# Patient Record
Sex: FEMALE | Race: OTHER
[De-identification: names, ages, dates, MRNs, and addresses within clinical notes are randomized per-mention and may not be internally consistent; named-entity substitution may affect disease eponyms.]

---

## 2020-08-16 ENCOUNTER — HOSPITAL ENCOUNTER (EMERGENCY)
Dept: HOSPITAL 41 - JD.ED | Age: 45
LOS: 1 days | Discharge: HOME | End: 2020-08-17
Payer: SELF-PAY

## 2020-08-16 DIAGNOSIS — E66.9: ICD-10-CM

## 2020-08-16 DIAGNOSIS — F41.9: Primary | ICD-10-CM

## 2020-08-16 PROCEDURE — 80053 COMPREHEN METABOLIC PANEL: CPT

## 2020-08-16 PROCEDURE — 93005 ELECTROCARDIOGRAM TRACING: CPT

## 2020-08-16 PROCEDURE — 99285 EMERGENCY DEPT VISIT HI MDM: CPT

## 2020-08-16 PROCEDURE — 84484 ASSAY OF TROPONIN QUANT: CPT

## 2020-08-16 PROCEDURE — 85025 COMPLETE CBC W/AUTO DIFF WBC: CPT

## 2020-08-16 PROCEDURE — 36415 COLL VENOUS BLD VENIPUNCTURE: CPT

## 2020-08-16 PROCEDURE — 85730 THROMBOPLASTIN TIME PARTIAL: CPT

## 2020-08-16 PROCEDURE — 81025 URINE PREGNANCY TEST: CPT

## 2020-08-16 PROCEDURE — 85610 PROTHROMBIN TIME: CPT

## 2020-08-16 PROCEDURE — 83735 ASSAY OF MAGNESIUM: CPT

## 2020-08-16 PROCEDURE — 71045 X-RAY EXAM CHEST 1 VIEW: CPT

## 2020-08-16 PROCEDURE — 93225 XTRNL ECG REC<48 HRS REC: CPT

## 2020-08-16 NOTE — EDM.PDOC
ED HPI GENERAL MEDICAL PROBLEM





- General


Chief Complaint: Cardiovascular Problem


Stated Complaint: IRREGUALR HEART BEAT SOB


Time Seen by Provider: 08/16/20 22:00





- History of Present Illness


INITIAL COMMENTS - FREE TEXT/NARRATIVE: 





45-year-old female presents the emergency room with palpitations and what she 

thinks is an irregular heartbeat.





This started a couple hours prior to arrival the patient feels a little bit 

anxious.  Patient denies any breathing difficulties chest pain or shortness of 

breath.  Patient has a history of prediabetes but denies any other medical 

problems.  The patient is visiting family from [place].  And is scheduled to go 

home this next week.


  ** Mid-Sternal Chest


Pain Score (Numeric/FACES): 4





- Related Data


                                    Allergies











Allergy/AdvReac Type Severity Reaction Status Date / Time


 


No Known Allergies Allergy   Verified 08/16/20 21:40











Home Meds: 


                                    Home Meds





Amoxicillin [Amoxil 250 MG/5 ML Susp] 500 mg PO TID 08/16/20 [History]


LORazepam [Ativan] 1 mg PO ASDIRECTED PRN #5 tablet 08/17/20 [Rx]











Past Medical History





- Past Surgical History


HEENT Surgical History: Reports: Oral Surgery





Social & Family History





- Tobacco Use


Smoking Status *Q: Never Smoker





- Caffeine Use


Caffeine Use: Reports: None





- Recreational Drug Use


Recreational Drug Use: No





ED ROS GENERAL





- Review of Systems


Review Of Systems: See Below


Constitutional: Reports: No Symptoms


HEENT: Reports: No Symptoms


Respiratory: Reports: No Symptoms


Cardiovascular: Reports: Palpitations.  Denies: Chest Pain


Endocrine: Reports: No Symptoms


GI/Abdominal: Reports: No Symptoms


: Reports: No Symptoms


Neurological: Reports: No Symptoms


Psychiatric: Reports: No Symptoms





ED EXAM, GENERAL





- Physical Exam


Exam: See Below


Exam Limited By: Language Barrier (She speaks pretty good English her daughter 

is able to translate if any clarification needs to be done)


General Appearance: Alert, No Apparent Distress, Obese


Head: Atraumatic, Normocephalic


Respiratory/Chest: No Respiratory Distress, Lungs Clear, Normal Breath Sounds


Cardiovascular: No Edema, No Murmur, Tachycardia, Other (Mild tachycardia rate 

about 110 during my exam)


GI/Abdominal: Normal Bowel Sounds, Soft, Non-Tender, Other (Significant 

abdominal obesity)


Back Exam: Normal Inspection.  No: CVA Tenderness (L), CVA Tenderness (R)


Extremities: Normal Inspection, Non-Tender, No Pedal Edema


Neurological: Alert, Oriented, Normal Cognition


Psychiatric: Anxious





Course





- Vital Signs


Last Recorded V/S: 


                                Last Vital Signs











Temp  36.2 C   08/16/20 23:49


 


Pulse  96   08/16/20 23:49


 


Resp  18   08/16/20 23:49


 


BP  130/64   08/16/20 23:49


 


Pulse Ox  96   08/16/20 23:49














- Orders/Labs/Meds


Orders: 


                               Active Orders 24 hr











 Category Date Time Status


 


 EKG Documentation Completion [RC] ASDIRECTED Care  08/16/20 22:14 Active


 


 Holter Monitor 24 Hours [RC] .PRN Care  08/16/20 23:57 Active


 


 Chest 1V Frontal [CR] Stat Exams  08/16/20 22:10 Taken


 


 EKG 12 Lead [EK] Stat Ther  08/16/20 22:14 Ordered











Labs: 


                                Laboratory Tests











  08/16/20 08/16/20 08/16/20 Range/Units





  22:22 22:22 22:22 


 


WBC    7.55  (3.98-10.04)  K/mm3


 


RBC    4.69  (3.98-5.22)  M/mm3


 


Hgb    12.3  (11.2-15.7)  gm/dl


 


Hct    38.6  (34.1-44.9)  %


 


MCV    82.3  (79.4-94.8)  fl


 


MCH    26.2  (25.6-32.2)  pg


 


MCHC    31.9 L  (32.2-35.5)  g/dl


 


RDW Std Deviation    44.1  (36.4-46.3)  fL


 


Plt Count    261  (182-369)  K/mm3


 


MPV    9.6  (9.4-12.3)  fl


 


Neut % (Auto)    55.4  (34.0-71.1)  %


 


Lymph % (Auto)    32.1  (19.3-51.7)  %


 


Mono % (Auto)    10.5  (4.7-12.5)  %


 


Eos % (Auto)    1.3  (0.7-5.8)  


 


Baso % (Auto)    0.4  (0.1-1.2)  %


 


Neut # (Auto)    4.19  (1.56-6.13)  K/mm3


 


Lymph # (Auto)    2.42  (1.18-3.74)  K/mm3


 


Mono # (Auto)    0.79 H  (0.24-0.36)  K/mm3


 


Eos # (Auto)    0.10  (0.04-0.36)  K/mm3


 


Baso # (Auto)    0.03  (0.01-0.08)  K/mm3


 


PT  10.7    (9.7-12.0)  SECONDS


 


INR  1.00    


 


APTT  24    (22-31)  SECONDS


 


Sodium   139   (136-145)  mEq/L


 


Potassium   3.9   (3.5-5.1)  mEq/L


 


Chloride   104   ()  mEq/L


 


Carbon Dioxide   27   (21-32)  mEq/L


 


Anion Gap   11.9   (5-15)  


 


BUN   8   (7-18)  mg/dL


 


Creatinine   0.7   (0.55-1.02)  mg/dL


 


Est Cr Clr Drug Dosing   72.13   mL/min


 


Estimated GFR (MDRD)   > 60   (>60)  mL/min


 


BUN/Creatinine Ratio   11.4 L   (14-18)  


 


Glucose   105   ()  mg/dL


 


Calcium   9.4   (8.5-10.1)  mg/dL


 


Magnesium   1.8   (1.8-2.4)  mg/dl


 


Total Bilirubin   0.3   (0.2-1.0)  mg/dL


 


AST   22   (15-37)  U/L


 


ALT   38   (14-59)  U/L


 


Alkaline Phosphatase   93   ()  U/L


 


Troponin I   < 0.017   (0.00-0.056)  ng/mL


 


Total Protein   7.6   (6.4-8.2)  g/dl


 


Albumin   3.7   (3.4-5.0)  g/dl


 


Globulin   3.9   gm/dL


 


Albumin/Globulin Ratio   1.0   (1-2)  


 


Urine HCG, Qual     (NEGATIVE)  














  08/16/20 Range/Units





  23:13 


 


WBC   (3.98-10.04)  K/mm3


 


RBC   (3.98-5.22)  M/mm3


 


Hgb   (11.2-15.7)  gm/dl


 


Hct   (34.1-44.9)  %


 


MCV   (79.4-94.8)  fl


 


MCH   (25.6-32.2)  pg


 


MCHC   (32.2-35.5)  g/dl


 


RDW Std Deviation   (36.4-46.3)  fL


 


Plt Count   (182-369)  K/mm3


 


MPV   (9.4-12.3)  fl


 


Neut % (Auto)   (34.0-71.1)  %


 


Lymph % (Auto)   (19.3-51.7)  %


 


Mono % (Auto)   (4.7-12.5)  %


 


Eos % (Auto)   (0.7-5.8)  


 


Baso % (Auto)   (0.1-1.2)  %


 


Neut # (Auto)   (1.56-6.13)  K/mm3


 


Lymph # (Auto)   (1.18-3.74)  K/mm3


 


Mono # (Auto)   (0.24-0.36)  K/mm3


 


Eos # (Auto)   (0.04-0.36)  K/mm3


 


Baso # (Auto)   (0.01-0.08)  K/mm3


 


PT   (9.7-12.0)  SECONDS


 


INR   


 


APTT   (22-31)  SECONDS


 


Sodium   (136-145)  mEq/L


 


Potassium   (3.5-5.1)  mEq/L


 


Chloride   ()  mEq/L


 


Carbon Dioxide   (21-32)  mEq/L


 


Anion Gap   (5-15)  


 


BUN   (7-18)  mg/dL


 


Creatinine   (0.55-1.02)  mg/dL


 


Est Cr Clr Drug Dosing   mL/min


 


Estimated GFR (MDRD)   (>60)  mL/min


 


BUN/Creatinine Ratio   (14-18)  


 


Glucose   ()  mg/dL


 


Calcium   (8.5-10.1)  mg/dL


 


Magnesium   (1.8-2.4)  mg/dl


 


Total Bilirubin   (0.2-1.0)  mg/dL


 


AST   (15-37)  U/L


 


ALT   (14-59)  U/L


 


Alkaline Phosphatase   ()  U/L


 


Troponin I   (0.00-0.056)  ng/mL


 


Total Protein   (6.4-8.2)  g/dl


 


Albumin   (3.4-5.0)  g/dl


 


Globulin   gm/dL


 


Albumin/Globulin Ratio   (1-2)  


 


Urine HCG, Qual  Negative  (NEGATIVE)  











Meds: 


Medications














Discontinued Medications














Generic Name Dose Route Start Last Admin





  Trade Name Freq  PRN Reason Stop Dose Admin


 


Lorazepam  1 mg  08/16/20 22:14  08/17/20 00:11





  Ativan  IVPUSH  08/16/20 22:15  Not Given





  ONETIME ONE  


 


Lorazepam  1 mg  08/16/20 22:27  08/16/20 23:03





  Ativan  PO  08/16/20 22:28  1 mg





  ONETIME ONE   Administration














- Re-Assessments/Exams


Free Text/Narrative Re-Assessment/Exam: 





08/17/20 00:14


Patient is doing much better after some Ativan however her pulse still runs a 

little on the fast side.  Discussed the importance of further evaluation down 

the road including a Holter monitor and after discussion the patient and 

daughter decided that the patient should probably stay here to have the Holter 

done and is willing to follow-up in the hospital clinic so this can get sorted 

out and delay her departure to [place].


08/17/20 01:24


Laboratory evaluation is complete the Holter monitor is on we will discharge at 

this time.





Departure





- Departure


Time of Disposition: 01:24


Disposition: Home, Self-Care 01


Clinical Impression: 


 Palpitations, Tachycardia, Anxiety





Referrals: 


PCP,Not In Area [Primary Care Provider] - 


Forms:  ED Department Discharge


Additional Instructions: 


Return to the emergency room with any questions problems or worsening symptoms.





Return the Holter monitor as instructed.





Follow-up in the hospital clinic at the end of this next week.  Call tomorrow to

schedule an appointment.  [phone]





You have been given a prescription for Ativan, use this as directed and as 

needed for anxiety.











Sepsis Event Note (ED)





- Evaluation


Sepsis Screening Result: No Definite Risk





- Focused Exam


Vital Signs: 


                                   Vital Signs











  Temp Pulse Resp BP Pulse Ox


 


 08/16/20 23:49  36.2 C  96  18  130/64  96


 


 08/16/20 23:05  36.1 C  105 H  18  126/77  95


 


 08/16/20 21:37  36.1 C  118 H  22 H  125/68  99














- My Orders


Last 24 Hours: 


My Active Orders





08/16/20 22:10


Chest 1V Frontal [CR] Stat 





08/16/20 22:14


EKG Documentation Completion [RC] ASDIRECTED 


EKG 12 Lead [EK] Stat 





08/16/20 23:57


Holter Monitor 24 Hours [RC] .PRN 














- Assessment/Plan


Last 24 Hours: 


My Active Orders





08/16/20 22:10


Chest 1V Frontal [CR] Stat 





08/16/20 22:14


EKG Documentation Completion [RC] ASDIRECTED 


EKG 12 Lead [EK] Stat 





08/16/20 23:57


Holter Monitor 24 Hours [RC] .PRN

## 2020-08-17 NOTE — CR
Chest: Frontal view of the chest was obtained.

 

Comparison: No previous chest imaging is available.

 

Heart size and mediastinum are normal.  Lungs are clear with no acute 

parenchymal change.  Bony structures are grossly intact.

 

Impression:

1.  Nothing acute is seen on frontal chest x-ray.

 

Diagnostic code #1

 

This report was dictated in MDT